# Patient Record
Sex: MALE | Race: WHITE | Employment: OTHER | URBAN - METROPOLITAN AREA
[De-identification: names, ages, dates, MRNs, and addresses within clinical notes are randomized per-mention and may not be internally consistent; named-entity substitution may affect disease eponyms.]

---

## 2018-05-01 ENCOUNTER — OFFICE VISIT (OUTPATIENT)
Dept: SURGERY | Age: 73
End: 2018-05-01

## 2018-05-01 VITALS
DIASTOLIC BLOOD PRESSURE: 63 MMHG | HEIGHT: 66 IN | TEMPERATURE: 98 F | HEART RATE: 57 BPM | OXYGEN SATURATION: 99 % | BODY MASS INDEX: 34.65 KG/M2 | WEIGHT: 215.6 LBS | SYSTOLIC BLOOD PRESSURE: 134 MMHG | RESPIRATION RATE: 20 BRPM

## 2018-05-01 DIAGNOSIS — Z12.11 ENCOUNTER FOR SCREENING COLONOSCOPY: Primary | ICD-10-CM

## 2018-05-01 RX ORDER — LANOLIN ALCOHOL/MO/W.PET/CERES
CREAM (GRAM) TOPICAL
COMMUNITY

## 2018-05-01 RX ORDER — POLYETHYLENE GLYCOL 3350, SODIUM SULFATE ANHYDROUS, SODIUM BICARBONATE, SODIUM CHLORIDE, POTASSIUM CHLORIDE 236; 22.74; 6.74; 5.86; 2.97 G/4L; G/4L; G/4L; G/4L; G/4L
POWDER, FOR SOLUTION ORAL
Qty: 1 BOTTLE | Refills: 0 | Status: SHIPPED | OUTPATIENT
Start: 2018-05-01 | End: 2018-05-17

## 2018-05-01 NOTE — PROGRESS NOTES
[place]  [address] [address]  [phone]    Colonoscopy History and Physical    Patient: Bladimir Serrano MRN: [id number]  SSN: [id number]    YOB: 1945  Age: 81 y.o. Sex: male      Subjective:      Bladimir Serrano is a 81 y.o. male who presents for colonoscopy for   Personal history of colon polyps (screening only), Screening colonoscopy. Patient has no complaints except for occasional gas. Last colonoscopy 5 years ago, 2 benign polyps found. Patient reports family history and abdominal surgeries as noted below. Past Medical History:   Diagnosis Date    Anemia     Epididymitis, left     Hypercholesterolemia     Hypertension     Stroke [facility])      Past Surgical History:   Procedure Laterality Date    HX COLONOSCOPY      HX OTHER SURGICAL  10/1/2015    [facility], Lumbar, Dr. Enrique Barnes [address] [facility]  11/4/2015    [facility], Hardware revision with removal of left S1 screw, Dr. Enrique Barnes [address] [facility]  11/11/2015    [facility], Revision of posterior fusion hardware including placement of left S1 screw, placement of cross-link, intraoperative neuronavigation and intraoperative neuromonitoring. Also revision of posterolateral arthrodesis, Dr. Enrique Barnes HX SHOULDER REPLACEMENT        Family History   Problem Relation Age of Onset    Asthma Other      Social History   Substance Use Topics    Smoking status: Never Smoker    Smokeless tobacco: Never Used    Alcohol use No      Prior to Admission medications    Medication Sig Start Date End Date Taking? Authorizing Provider   ferrous sulfate 325 mg (65 mg iron) tablet Take  by mouth Daily (before breakfast). Yes Historical Provider   lansoprazole (PREVACID) 30 mg capsule Take  by mouth Daily (before breakfast). Yes Historical Provider   gabapentin (NEURONTIN) 800 mg tablet Take  by mouth three (3) times daily.    Yes Historical Provider   Desvenlafaxine SR (PRISTIQ) 50 mg tablet Take 50 mg by mouth. Yes Historical Provider   metoprolol (LOPRESSOR) 50 mg tablet Take  by mouth two (2) times a day. Yes Historical Provider   simvastatin (ZOCOR) 40 mg tablet Take  by mouth nightly. Yes Historical Provider   traMADol (ULTRAM) 50 mg tablet Take 50 mg by mouth every six (6) hours as needed for Pain. Yes Historical Provider   traZODone (DESYREL) 50 mg tablet Take  by mouth nightly. Yes Historical Provider   loteprednol etabonate (ALREX) 0.2 % drps Administer 1 Drop to both eyes four (4) times daily. Yes Historical Provider   omega-3 fatty acids-vitamin e (FISH OIL) 1,000 mg cap Take 1 Cap by mouth. Yes Historical Provider   multivitamin (ONE A DAY) tablet Take 1 Tab by mouth daily. Yes Historical Provider   ascorbic acid (VITAMIN C) 250 mg tablet Take  by mouth. Yes Historical Provider   aspirin delayed-release 81 mg tablet Take  by mouth daily. Yes Historical Provider   cephALEXin (KEFLEX) 500 mg capsule Take 500 mg by mouth four (4) times daily. Historical Provider   methylPREDNISolone (MEDROL) 4 mg tablet Take  by mouth daily (with breakfast). Historical Provider   cyclobenzaprine (FLEXERIL) 10 mg tablet Take  by mouth three (3) times daily as needed for Muscle Spasm(s). Historical Provider   flurbiprofen (ANSAID) 100 mg tablet Take 100 mg by mouth two (2) times a day. Historical Provider   irbesartan-hydrochlorothiazide (AVALIDE) 150-12.5 mg per tablet Take  by mouth. Historical Provider   clopidogrel (PLAVIX) 75 mg tablet Take  by mouth daily. Historical Provider   oxyCODONE-acetaminophen (PERCOCET) 5-325 mg per tablet Take 1 Tab by mouth every four (4) hours as needed for Pain. Historical Provider   diazepam (VALIUM) 5 mg tablet Take 5 mg by mouth every six (6) hours as needed for Anxiety. Historical Provider   docusate sodium (COLACE) 100 mg capsule Take 100 mg by mouth two (2) times a day.     Historical Provider   CETIRIZINE HCL (ALLER-JOI PO) Take  by mouth. Historical Provider        No Known Allergies    Review of Systems:  Review of systems performed with findings as noted. Objective:     Vitals:    05/01/18 0900   BP: 134/63   Pulse: (!) 57   Resp: 20   Temp: 98 °F (36.7 °C)   TempSrc: Oral   SpO2: 99%   Weight: 97.8 kg (215 lb 9.6 oz)   Height: 5' 6\" (1.676 m)        Physical Exam:  GENERAL: alert, cooperative, no distress, appears stated age  LUNG: clear to auscultation bilaterally  HEART: regular rate and rhythm, S1, S2 normal, no murmur, click, rub or gallop  ABDOMEN: soft, non-tender. Bowel sounds normal. No masses,  no organomegaly  NEUROLOGIC: negative  PSYCHIATRIC: non focal    Assessment:   Jan Devi is a 81 y.o. male who presents for colonoscopy for   Personal history of colon polyps (screening only), Screening colonoscopy    Plan:   1. I recommend proceeding with colonoscopy. The patient was in full agreement and was eager to proceed. 2. I discussed the details of the colonoscopy procedure. The risks of colonoscopy were discussed including colon injury/perforation, anesthesia issues, bleeding, and the possibility of incomplete examination. The patient was willing to accept these risks and proceed with the examination. All questions were answered to the patient's satisfaction. 3. The patient was provided with the instructions in preparation for the colonoscopy procedure including the bowel prep recommendations.

## 2018-05-01 NOTE — PROGRESS NOTES
Colon Screen    Patient: Jude Yeager MRN: [id number]  SSN: [id number]    YOB: 1945  Age: 81 y.o. Sex: male        Subjective:   Jude Yeager presents for colon screening. PCP is Harsh Jimenez NP. Patient DENIES rectal pain or bleeding. Abdominal surgeries as described below, specifically back surgery 2 rods in back with screws, went in through abdomen to get to back. Patient has a bowel movement 1 per day. Patient denies changes in weight, bowel habits, or appetite. Patient has no known family history of colon cancer. Last colonoscopy was 8 year. No Known Allergies    Past Medical History:   Diagnosis Date    Epididymitis, left     Hypertension     Stroke [facility])      Past Surgical History:   Procedure Laterality Date    HX COLONOSCOPY      HX OTHER SURGICAL  10/1/2015    [facility], Lumbar, Dr. Mac Gamboa [address] [facility]  11/4/2015    [facility], Hardware revision with removal of left S1 screw, Dr. Mac Gamboa [address] [facility]  11/11/2015    [facility], Revision of posterior fusion hardware including placement of left S1 screw, placement of cross-link, intraoperative neuronavigation and intraoperative neuromonitoring. Also revision of posterolateral arthrodesis, Dr. Mac Gamboa HX SHOULDER REPLACEMENT        Family History   Problem Relation Age of Onset    Asthma Other      Social History   Substance Use Topics    Smoking status: Never Smoker    Smokeless tobacco: Never Used    Alcohol use No      Prior to Admission medications    Medication Sig Start Date End Date Taking? Authorizing Provider   lansoprazole (PREVACID) 30 mg capsule Take  by mouth Daily (before breakfast). Yes Historical Provider   gabapentin (NEURONTIN) 800 mg tablet Take  by mouth three (3) times daily. Yes Historical Provider   Desvenlafaxine SR (PRISTIQ) 50 mg tablet Take 50 mg by mouth. Yes Historical Provider   metoprolol (LOPRESSOR) 50 mg tablet Take  by mouth two (2) times a day.    Yes Historical Provider   simvastatin (ZOCOR) 40 mg tablet Take  by mouth nightly. Yes Historical Provider   traMADol (ULTRAM) 50 mg tablet Take 50 mg by mouth every six (6) hours as needed for Pain. Yes Historical Provider   traZODone (DESYREL) 50 mg tablet Take  by mouth nightly. Yes Historical Provider   loteprednol etabonate (ALREX) 0.2 % drps Administer 1 Drop to both eyes four (4) times daily. Yes Historical Provider   omega-3 fatty acids-vitamin e (FISH OIL) 1,000 mg cap Take 1 Cap by mouth. Yes Historical Provider   multivitamin (ONE A DAY) tablet Take 1 Tab by mouth daily. Yes Historical Provider   ascorbic acid (VITAMIN C) 250 mg tablet Take  by mouth. Yes Historical Provider   aspirin delayed-release 81 mg tablet Take  by mouth daily. Yes Historical Provider   cephALEXin (KEFLEX) 500 mg capsule Take 500 mg by mouth four (4) times daily. Historical Provider   methylPREDNISolone (MEDROL) 4 mg tablet Take  by mouth daily (with breakfast). Historical Provider   cyclobenzaprine (FLEXERIL) 10 mg tablet Take  by mouth three (3) times daily as needed for Muscle Spasm(s). Historical Provider   flurbiprofen (ANSAID) 100 mg tablet Take 100 mg by mouth two (2) times a day. Historical Provider   irbesartan-hydrochlorothiazide (AVALIDE) 150-12.5 mg per tablet Take  by mouth. Historical Provider   clopidogrel (PLAVIX) 75 mg tablet Take  by mouth daily. Historical Provider   oxyCODONE-acetaminophen (PERCOCET) 5-325 mg per tablet Take 1 Tab by mouth every four (4) hours as needed for Pain. Historical Provider   diazepam (VALIUM) 5 mg tablet Take 5 mg by mouth every six (6) hours as needed for Anxiety. Historical Provider   docusate sodium (COLACE) 100 mg capsule Take 100 mg by mouth two (2) times a day. Historical Provider   CETIRIZINE HCL (ALLER-JOI PO) Take  by mouth.     Historical Provider          Review of Systems:  Gastrointestinal: negative      Risks colonoscopy described- colon injury, missed lesion, anesthesia problems, bleeding. Colonoscopy preparation reviewed in detail with patient and a copy of the instructions was provided to the patient.        Albert Jones LPN  May 1, 9287  9:59 AM

## 2018-05-01 NOTE — PATIENT INSTRUCTIONS
If you have any questions or concerns about today's appointment, the verbal and/or written instructions you were given for follow up care, please call our office at [phone].     [facility] - [address]    [phone] office  [phone]

## 2018-05-17 ENCOUNTER — HOSPITAL ENCOUNTER (OUTPATIENT)
Age: 73
Setting detail: OUTPATIENT SURGERY
Discharge: HOME | End: 2018-05-17
Attending: COLON & RECTAL SURGERY | Admitting: COLON & RECTAL SURGERY
Payer: OTHER GOVERNMENT

## 2018-05-17 VITALS
HEIGHT: 66 IN | SYSTOLIC BLOOD PRESSURE: 159 MMHG | HEART RATE: 73 BPM | DIASTOLIC BLOOD PRESSURE: 72 MMHG | RESPIRATION RATE: 12 BRPM | TEMPERATURE: 98.2 F | WEIGHT: 210 LBS | OXYGEN SATURATION: 96 % | BODY MASS INDEX: 33.75 KG/M2

## 2018-05-17 PROCEDURE — 77030031670 HC DEV INFL ENDOTEK BIG60 MRTM -B: Performed by: COLON & RECTAL SURGERY

## 2018-05-17 PROCEDURE — G0500 MOD SEDAT ENDO SERVICE >5YRS: HCPCS | Performed by: COLON & RECTAL SURGERY

## 2018-05-17 PROCEDURE — 99153 MOD SED SAME PHYS/QHP EA: CPT | Performed by: COLON & RECTAL SURGERY

## 2018-05-17 PROCEDURE — 74011250636 HC RX REV CODE- 250/636: Performed by: COLON & RECTAL SURGERY

## 2018-05-17 PROCEDURE — 76040000007: Performed by: COLON & RECTAL SURGERY

## 2018-05-17 RX ORDER — NALOXONE HYDROCHLORIDE 0.4 MG/ML
0.4 INJECTION, SOLUTION INTRAMUSCULAR; INTRAVENOUS; SUBCUTANEOUS
Status: DISCONTINUED | OUTPATIENT
Start: 2018-05-17 | End: 2018-05-17 | Stop reason: HOSPADM

## 2018-05-17 RX ORDER — FLUMAZENIL 0.1 MG/ML
0.2 INJECTION INTRAVENOUS
Status: DISCONTINUED | OUTPATIENT
Start: 2018-05-17 | End: 2018-05-17 | Stop reason: HOSPADM

## 2018-05-17 RX ORDER — SODIUM CHLORIDE 0.9 % (FLUSH) 0.9 %
5-10 SYRINGE (ML) INJECTION EVERY 8 HOURS
Status: DISCONTINUED | OUTPATIENT
Start: 2018-05-17 | End: 2018-05-17 | Stop reason: HOSPADM

## 2018-05-17 RX ORDER — MIDAZOLAM HYDROCHLORIDE 1 MG/ML
.25-5 INJECTION, SOLUTION INTRAMUSCULAR; INTRAVENOUS
Status: DISCONTINUED | OUTPATIENT
Start: 2018-05-17 | End: 2018-05-17 | Stop reason: HOSPADM

## 2018-05-17 RX ORDER — ATROPINE SULFATE 0.1 MG/ML
0.5 INJECTION INTRAVENOUS
Status: DISCONTINUED | OUTPATIENT
Start: 2018-05-17 | End: 2018-05-17 | Stop reason: HOSPADM

## 2018-05-17 RX ORDER — SODIUM CHLORIDE 0.9 % (FLUSH) 0.9 %
5-10 SYRINGE (ML) INJECTION AS NEEDED
Status: DISCONTINUED | OUTPATIENT
Start: 2018-05-17 | End: 2018-05-17 | Stop reason: HOSPADM

## 2018-05-17 RX ORDER — SODIUM CHLORIDE 9 MG/ML
50 INJECTION, SOLUTION INTRAVENOUS CONTINUOUS
Status: DISCONTINUED | OUTPATIENT
Start: 2018-05-17 | End: 2018-05-17 | Stop reason: HOSPADM

## 2018-05-17 RX ORDER — EPINEPHRINE 0.1 MG/ML
1 INJECTION INTRACARDIAC; INTRAVENOUS
Status: DISCONTINUED | OUTPATIENT
Start: 2018-05-17 | End: 2018-05-17 | Stop reason: HOSPADM

## 2018-05-17 RX ORDER — DEXTROMETHORPHAN/PSEUDOEPHED 2.5-7.5/.8
1.2 DROPS ORAL
Status: DISCONTINUED | OUTPATIENT
Start: 2018-05-17 | End: 2018-05-17 | Stop reason: HOSPADM

## 2018-05-17 RX ADMIN — SODIUM CHLORIDE 50 ML/HR: 900 INJECTION, SOLUTION INTRAVENOUS at 12:20

## 2018-05-17 NOTE — DISCHARGE INSTRUCTIONS
Colonoscopy Discharge Instructions       Tammi Ferreira  [id number]  1945      COLONOSCOPY FINDINGS:  Your colonoscopy showed: 1. Mostly moderate to severe diverticulosis of the colon. 2. Otherwise normal examination. FOLLOW UP RECOMMENDATIONS:   Dr. Rik Truong recommends your next colonoscopy in 5 years. DISCOMFORT:  If you have redness at your IV site- apply warm compress to area; if redness or soreness persist- contact your physician  There may be a slight amount of blood passed from the rectum, more than a teaspoon of bright red blood is not expected - contact your physician  Gaseous discomfort is common- walking, belching will help relieve any gas pains. If discomfort persist- contact your physician    DIET:   High fiber diet. ACTIVITY:  You may resume your normal daily activities, however, it is recommended that you spend the remainder of the day resting - avoiding any strenuous activities. You may not operate a vehicle for 24 hours  You may not engage in an occupation involving machinery or appliances for rest of today  You may not drink alcoholic beverages for at least 24 hours  Avoid making any critical decisions for at least 24 hour    CALL M.D.   ANY SIGN OF:   Increasing pain, nausea, vomiting  Abdominal distension (swelling)  New increased bleeding   Fever or chills  Pain in chest area or shortness of breath      Amanda Schneider MD, FACS, FASCRS  Colon and Rectal Surgery  [facility]  Office [phone]  Fax     [phone] SUMMARY from Nurse    PATIENT INSTRUCTIONS:    After general anesthesia or intravenous sedation, for 24 hours or while taking prescription Narcotics:  · Limit your activities  · Do not drive and operate hazardous machinery  · Do not make important personal or business decisions  · Do  not drink alcoholic beverages  · If you have not urinated within 8 hours after discharge, please contact your surgeon on call.    Report the following to your surgeon:  · Excessive pain, swelling, redness or odor of or around the surgical area  · Temperature over 100.5  · Nausea and vomiting lasting longer than 4 hours or if unable to take medications  · Any signs of decreased circulation or nerve impairment to extremity: change in color, persistent  numbness, tingling, coldness or increase pain  · Any questions    What to do at Home:  Recommended activity: Activity as tolerated and no driving for today    These are general instructions for a healthy lifestyle:    No smoking/ No tobacco products/ Avoid exposure to second hand smoke  Surgeon General's Warning:  Quitting smoking now greatly reduces serious risk to your health. Obesity, smoking, and sedentary lifestyle greatly increases your risk for illness    A healthy diet, regular physical exercise & weight monitoring are important for maintaining a healthy lifestyle    You may be retaining fluid if you have a history of heart failure or if you experience any of the following symptoms:  Weight gain of 3 pounds or more overnight or 5 pounds in a week, increased swelling in our hands or feet or shortness of breath while lying flat in bed. Please call your doctor as soon as you notice any of these symptoms; do not wait until your next office visit. Recognize signs and symptoms of STROKE:    F-face looks uneven    A-arms unable to move or move unevenly    S-speech slurred or non-existent    T-time-call 911 as soon as signs and symptoms begin-DO NOT go       Back to bed or wait to see if you get better-TIME IS BRAIN. Warning Signs of HEART ATTACK     Call 911 if you have these symptoms:   Chest discomfort. Most heart attacks involve discomfort in the center of the chest that lasts more than a few minutes, or that goes away and comes back. It can feel like uncomfortable pressure, squeezing, fullness, or pain.  Discomfort in other areas of the upper body.  Symptoms can include pain or discomfort in one or both arms, the back, neck, jaw, or stomach.  Shortness of breath with or without chest discomfort.  Other signs may include breaking out in a cold sweat, nausea, or lightheadedness. Don't wait more than five minutes to call 911 - MINUTES MATTER! Fast action can save your life. Calling 911 is almost always the fastest way to get lifesaving treatment. Emergency Medical Services staff can begin treatment when they arrive -- up to an hour sooner than if someone gets to the hospital by car. The discharge information has been reviewed with the patient. The patient verbalized understanding. Discharge medications reviewed with the patient and appropriate educational materials and side effects teaching were provided. Patient armband removed and given to patient to take home. Patient was informed of the privacy risks if armband lost or stolen.

## 2018-05-17 NOTE — INTERVAL H&P NOTE
H&P Update:  Stalin Galvez was seen and examined. History and physical has been reviewed. The patient has been examined.  There have been no significant clinical changes since the completion of the originally dated History and Physical.    Signed By: Safia Parra MD     May 17, 2018 12:11 PM

## 2018-05-17 NOTE — IP AVS SNAPSHOT
[address] [facility], [place] 
 
 
 [address] 
[phone] Patient: Miguel Álvarez MRN: [id number] [facility] About your hospitalization You were admitted on:  May 17, 2018 You last received care in the:  [facility] PHASE 2 RECOVERY You were discharged on:  May 17, 2018 Why you were hospitalized Your primary diagnosis was:  Not on File Follow-up Information Follow up With Details Comments Contact Info Delmy Sharma MD   [address] [facility] [address] [id number] [phone] Sukhi Faustin MD  Please contact Dr Jefry Sykes for any questions [address] [facility] [address] [phone] Discharge Orders None A check josé indicates which time of day the medication should be taken. My Medications CONTINUE taking these medications Instructions Each Dose to Equal  
 Morning Noon Evening Bedtime ALLER-JOI PO Your last dose was: Your next dose is: Take  by mouth. ALREX 0.2 % Ezekiel Generic drug:  loteprednol etabonate Your last dose was: Your next dose is:    
   
   
 Administer 1 Drop to both eyes four (4) times daily. 1 Drop  
    
   
   
   
  
 aspirin delayed-release 81 mg tablet Your last dose was: Your next dose is: Take  by mouth daily. cephALEXin 500 mg capsule Commonly known as:  Tyron Neer Your last dose was: Your next dose is: Take 500 mg by mouth four (4) times daily. 500 mg  
    
   
   
   
  
 clopidogrel 75 mg Tab Commonly known as:  PLAVIX Your last dose was: Your next dose is: Take  by mouth daily. cyclobenzaprine 10 mg tablet Commonly known as:  FLEXERIL Your last dose was: Your next dose is: Take  by mouth three (3) times daily as needed for Muscle Spasm(s). diazePAM 5 mg tablet Commonly known as:  VALIUM Your last dose was: Your next dose is: Take 5 mg by mouth every six (6) hours as needed for Anxiety. 5 mg  
    
   
   
   
  
 docusate sodium 100 mg capsule Commonly known as:  Anastasia Rogelio Your last dose was: Your next dose is: Take 100 mg by mouth two (2) times a day. 100 mg  
    
   
   
   
  
 ferrous sulfate 325 mg (65 mg iron) tablet Your last dose was: Your next dose is: Take  by mouth Daily (before breakfast). FISH OIL 1,000 mg Cap Generic drug:  omega-3 fatty acids-vitamin e Your last dose was: Your next dose is: Take 1 Cap by mouth. 1 Cap  
    
   
   
   
  
 flurbiprofen 100 mg tablet Commonly known as:  ANSAID Your last dose was: Your next dose is: Take 100 mg by mouth two (2) times a day. 100 mg  
    
   
   
   
  
 gabapentin 800 mg tablet Commonly known as:  NEURONTIN Your last dose was: Your next dose is: Take  by mouth three (3) times daily. irbesartan-hydroCHLOROthiazide 150-12.5 mg per tablet Commonly known as:  AVALIDE Your last dose was: Your next dose is: Take  by mouth.  
     
   
   
   
  
 lansoprazole 30 mg capsule Commonly known as:  PREVACID Your last dose was: Your next dose is: Take  by mouth Daily (before breakfast). methylPREDNISolone 4 mg Tab Commonly known as:  MEDROL Your last dose was: Your next dose is: Take  by mouth daily (with breakfast). metoprolol tartrate 50 mg tablet Commonly known as:  LOPRESSOR Your last dose was: Your next dose is: Take  by mouth two (2) times a day. multivitamin tablet Commonly known as:  ONE A DAY Your last dose was: Your next dose is: Take 1 Tab by mouth daily. 1 Tab  
    
   
   
   
  
 oxyCODONE-acetaminophen 5-325 mg per tablet Commonly known as:  PERCOCET Your last dose was: Your next dose is: Take 1 Tab by mouth every four (4) hours as needed for Pain. 1 Tab PRISTIQ 50 mg ER tablet Generic drug:  desvenlafaxine succinate Your last dose was: Your next dose is: Take 50 mg by mouth. 50 mg  
    
   
   
   
  
 simvastatin 40 mg tablet Commonly known as:  ZOCOR Your last dose was: Your next dose is: Take  by mouth nightly. traMADol 50 mg tablet Commonly known as:  ULTRAM  
   
Your last dose was: Your next dose is: Take 50 mg by mouth every six (6) hours as needed for Pain. 50 mg  
    
   
   
   
  
 traZODone 50 mg tablet Commonly known as:  [place] Your last dose was: Your next dose is: Take  by mouth nightly. VITAMIN C 250 mg tablet Generic drug:  ascorbic acid (vitamin C) Your last dose was: Your next dose is: Take  by mouth. STOP taking these medications PEG 3350-Electrolytes 236-22.74-6.74 -5.86 gram suspension Commonly known as:  MK-REYNA Opioid Education Prescription Opioids: What You Need to Know: 
 
Prescription opioids can be used to help relieve moderate-to-severe pain and are often prescribed following a surgery or injury, or for certain health conditions. These medications can be an important part of treatment but also come with serious risks. Opioids are strong pain medicines.  Examples include hydrocodone, oxycodone, fentanyl, and morphine. Heroin is an example of an illegal opioid. It is important to work with your health care provider to make sure you are getting the safest, most effective care. WHAT ARE THE RISKS AND SIDE EFFECTS OF OPIOID USE? Prescription opioids carry serious risks of addiction and overdose, especially with prolonged use. An opioid overdose, often marked by slow breathing, can cause sudden death. The use of prescription opioids can have a number of side effects as well, even when taken as directed. · Tolerance-meaning you might need to take more of a medication for the same pain relief · Physical dependence-meaning you have symptoms of withdrawal when the medication is stopped. Withdrawal symptoms can include nausea, sweating, chills, diarrhea, stomach cramps, and muscle aches. Withdrawal can last up to several weeks, depending on which drug you took and how long you took it. · Increased sensitivity to pain · Constipation · Nausea, vomiting, and dry mouth · Sleepiness and dizziness · Confusion · Depression · Low levels of testosterone that can result in lower sex drive, energy, and strength · Itching and sweating RISKS ARE GREATER WITH:      
· History of drug misuse, substance use disorder, or overdose · Mental health conditions (such as depression or anxiety) · Sleep apnea · Older age (72 years or older) · Pregnancy Avoid alcohol while taking prescription opioids. Also, unless specifically advised by your health care provider, medications to avoid include: · Benzodiazepines (such as Xanax or Valium) · Muscle relaxants (such as Soma or Flexeril) · Hypnotics (such as Ambien or Lunesta) · Other prescription opioids KNOW YOUR OPTIONS Talk to your health care provider about ways to manage your pain that don't involve prescription opioids. Some of these options may actually work better and have fewer risks and side effects. Options may include: · Pain relievers such as acetaminophen, ibuprofen, and naproxen · Some medications that are also used for depression or seizures · Physical therapy and exercise · Counseling to help patients learn how to cope better with triggers of pain and stress. · Application of heat or cold compress · Massage therapy · Relaxation techniques Be Informed Make sure you know the name of your medication, how much and how often to take it, and its potential risks & side effects. IF YOU ARE PRESCRIBED OPIOIDS FOR PAIN: 
· Never take opioids in greater amounts or more often than prescribed. Remember the goal is not to be pain-free but to manage your pain at a tolerable level. · Follow up with your primary care provider to: · Work together to create a plan on how to manage your pain. · Talk about ways to help manage your pain that don't involve prescription opioids. · Talk about any and all concerns and side effects. · Help prevent misuse and abuse. · Never sell or share prescription opioids · Help prevent misuse and abuse. · Store prescription opioids in a secure place and out of reach of others (this may include visitors, children, friends, and family). · Safely dispose of unused/unwanted prescription opioids: Find your community drug take-back program or your pharmacy mail-back program, or flush them down the toilet, following guidance from the Food and Drug Administration (www.fda.gov/Drugs/ResourcesForYou). · Visit www.cdc.gov/drugoverdose to learn about the risks of opioid abuse and overdose. · If you believe you may be struggling with addiction, tell your health care provider and ask for guidance or call [employer] at 1-263-755-DRCT. Discharge Instructions Colonoscopy Discharge Instructions Otilia Aj 
[id number] 
1945 COLONOSCOPY FINDINGS: 
 Your colonoscopy showed: 1. Mostly moderate to severe diverticulosis of the colon. 2. Otherwise normal examination. FOLLOW UP RECOMMENDATIONS: 
 Dr. Carlos Wood recommends your next colonoscopy in 5 years. DISCOMFORT: 
If you have redness at your IV site- apply warm compress to area; if redness or soreness persist- contact your physician There may be a slight amount of blood passed from the rectum, more than a teaspoon of bright red blood is not expected - contact your physician Gaseous discomfort is common- walking, belching will help relieve any gas pains. If discomfort persist- contact your physician DIET: 
 High fiber diet. ACTIVITY: 
You may resume your normal daily activities, however, it is recommended that you spend the remainder of the day resting - avoiding any strenuous activities. You may not operate a vehicle for 24 hours You may not engage in an occupation involving machinery or appliances for rest of today You may not drink alcoholic beverages for at least 24 hours Avoid making any critical decisions for at least 24 hour CALL M.D. ANY SIGN OF: Increasing pain, nausea, vomiting Abdominal distension (swelling) New increased bleeding Fever or chills Pain in chest area or shortness of breath Edward Delacruz MD, FACS, FASCRS Colon and Rectal Surgery [facility] Office [phone] Fax     [phone] DISCHARGE SUMMARY from Nurse PATIENT INSTRUCTIONS: 
 
After general anesthesia or intravenous sedation, for 24 hours or while taking prescription Narcotics: · Limit your activities · Do not drive and operate hazardous machinery · Do not make important personal or business decisions · Do  not drink alcoholic beverages · If you have not urinated within 8 hours after discharge, please contact your surgeon on call. Report the following to your surgeon: · Excessive pain, swelling, redness or odor of or around the surgical area · Temperature over 100.5 · Nausea and vomiting lasting longer than 4 hours or if unable to take medications · Any signs of decreased circulation or nerve impairment to extremity: change in color, persistent  numbness, tingling, coldness or increase pain · Any questions What to do at Home: 
Recommended activity: Activity as tolerated and no driving for today These are general instructions for a healthy lifestyle: No smoking/ No tobacco products/ Avoid exposure to second hand smoke Surgeon General's Warning:  Quitting smoking now greatly reduces serious risk to your health. Obesity, smoking, and sedentary lifestyle greatly increases your risk for illness A healthy diet, regular physical exercise & weight monitoring are important for maintaining a healthy lifestyle You may be retaining fluid if you have a history of heart failure or if you experience any of the following symptoms:  Weight gain of 3 pounds or more overnight or 5 pounds in a week, increased swelling in our hands or feet or shortness of breath while lying flat in bed. Please call your doctor as soon as you notice any of these symptoms; do not wait until your next office visit. Recognize signs and symptoms of STROKE: 
 
F-face looks uneven A-arms unable to move or move unevenly S-speech slurred or non-existent T-time-call 911 as soon as signs and symptoms begin-DO NOT go Back to bed or wait to see if you get better-TIME IS BRAIN. Warning Signs of HEART ATTACK Call 911 if you have these symptoms: 
? Chest discomfort. Most heart attacks involve discomfort in the center of the chest that lasts more than a few minutes, or that goes away and comes back. It can feel like uncomfortable pressure, squeezing, fullness, or pain. ? Discomfort in other areas of the upper body.  Symptoms can include pain or discomfort in one or both arms, the back, neck, jaw, or stomach. ? Shortness of breath with or without chest discomfort. ? Other signs may include breaking out in a cold sweat, nausea, or lightheadedness. Don't wait more than five minutes to call [address]! Fast action can save your life. Calling 911 is almost always the fastest way to get lifesaving treatment. Emergency Medical Services staff can begin treatment when they arrive  up to an hour sooner than if someone gets to the hospital by car. The discharge information has been reviewed with the patient. The patient verbalized understanding. Discharge medications reviewed with the patient and appropriate educational materials and side effects teaching were provided. Patient armband removed and given to patient to take home. Patient was informed of the privacy risks if armband lost or stolen. Introducing [facility]! [facility] introduces SportsBeep patient portal. Now you can access parts of your medical record, email your doctor's office, and request medication refills online. 1. In your internet browser, go to [email or web address] 2. Click on the First Time User? Click Here link in the Sign In box. You will see the New Member Sign Up page. 3. Enter your SportsBeep Access Code exactly as it appears below. You will not need to use this code after youve completed the sign-up process. If you do not sign up before the expiration date, you must request a new code. · SportsBeep Access Code: [email or web address] Expires: 7/30/2018  8:18 AM 
 
4. Enter the last four digits of your Social Security Number (xxxx) and Date of Birth (mm/dd/yyyy) as indicated and click Submit. You will be taken to the next sign-up page. 5. Create a SportsBeep ID. This will be your SportsBeep login ID and cannot be changed, so think of one that is secure and easy to remember. 6. Create a [employer] password. You can change your password at any time. 7. Enter your Password Reset Question and Answer. This can be used at a later time if you forget your password. 8. Enter your e-mail address. You will receive e-mail notification when new information is available in [address] 9. Click Sign Up. You can now view and download portions of your medical record. 10. Click the Download Summary menu link to download a portable copy of your medical information. If you have questions, please visit the Frequently Asked Questions section of the [employer] website. Remember, [employer] Software is NOT to be used for urgent needs. For medical emergencies, dial 911. Now available from your iPhone and Android! Introducing Bruno López As a Ovidio Lee patient, I wanted to make you aware of our electronic visit tool called Bruno Parthabernicefin. Ovidio Eduquia/Qoopl allows you to connect within minutes with a medical provider 24 hours a day, seven days a week via a mobile device or tablet or logging into a secure website from your computer. You can access Bruno López from anywhere in the [place]. A virtual visit might be right for you when you have a simple condition and feel like you just dont want to get out of bed, or cant get away from work for an appointment, when your regular Ovidio Lee provider is not available (evenings, weekends or holidays), or when youre out of town and need minor care. Electronic visits cost only $49 and if the Ovidio Lee Swipe.to/Qoopl provider determines a prescription is needed to treat your condition, one can be electronically transmitted to a nearby pharmacy*. Please take a moment to enroll today if you have not already done so. The enrollment process is free and takes just a few minutes. To enroll, please download the [employer] Impact/Qoopl ronnie to your tablet or phone, or visit [email or web address] to enroll on your computer. And, as an [address] [place] [address] patient with a giddy account, the results of your visits will be scanned into your electronic medical record and your primary care provider will be able to view the scanned results. We urge you to continue to see your regular [employer] provider for your ongoing medical care. And while your primary care provider may not be the one available when you seek a Bruno Longfin virtual visit, the peace of mind you get from getting a real diagnosis real time can be priceless. For more information on Bruno Longfin, view our Frequently Asked Questions (FAQs) at [email or web address]. Sincerely, 
 
Luda Drake MD 
Chief Medical Officer [employer] *:  certain medications cannot be prescribed via Bruno Ethanfin Providers Seen During Your Hospitalization Provider Specialty Primary office phone Candace Go MD Colon and Rectal Surgery [phone] Your Primary Care Physician (PCP) Primary Care Physician Office Phone Office Paulo Bardales [phone] You are allergic to the following No active allergies Recent Documentation Height Weight BMI Smoking Status 1.676 m 95.3 kg 33.89 kg/m2 Never Smoker Emergency Contacts Name Discharge Info Relation Home Work Mobile Rukhsana Mercado DISCHARGE CAREGIVER [3] Spouse [3] [phone] Patient Belongings The following personal items are in your possession at time of discharge: 
  Dental Appliances: None  Visual Aid: None Please provide this summary of care documentation to your next provider. Signatures-by signing, you are acknowledging that this After Visit Summary has been reviewed with you and you have received a copy. Patient Signature:  ____________________________________________________________ Date:  ____________________________________________________________  
  
[place] [employer] Provider Signature:  ____________________________________________________________ Date:  ____________________________________________________________

## 2018-05-17 NOTE — H&P (VIEW-ONLY)
[address]  [address]  [address]  [address] [place] [address] [phone]    Colonoscopy History and Physical    Patient: Gabino Palacio MRN: [id number]  SSN: [id number]    YOB: 1945  Age: 81 y.o. Sex: male      Subjective:      Gabino Palacio is a 81 y.o. male who presents for colonoscopy for   Personal history of colon polyps (screening only), Screening colonoscopy. Patient has no complaints except for occasional gas. Last colonoscopy 5 years ago, 2 benign polyps found. Patient reports family history and abdominal surgeries as noted below. Past Medical History:   Diagnosis Date    Anemia     Epididymitis, left     Hypercholesterolemia     Hypertension     [facility])      Past Surgical History:   Procedure Laterality Date    HX COLONOSCOPY      HX OTHER SURGICAL  10/1/2015    [facility], Lumbar, Dr. Carlos Alberto Jimenez [address] [facility]  11/4/2015    [facility], Hardware revision with removal of left S1 screw, Dr. Carlos Alberto Jimenez [facility] [facility]  11/11/2015    [facility], Revision of posterior fusion hardware including placement of left S1 screw, placement of cross-link, intraoperative neuronavigation and intraoperative neuromonitoring. Also revision of posterolateral arthrodesis, Dr. Carlos Alberto Jimenez HX SHOULDER REPLACEMENT        Family History   Problem Relation Age of Onset    Asthma Other      Social History   Substance Use Topics    Smoking status: Never Smoker    Smokeless tobacco: Never Used    Alcohol use No      Prior to Admission medications    Medication Sig Start Date End Date Taking? Authorizing Provider   ferrous sulfate 325 mg (65 mg iron) tablet Take  by mouth Daily (before breakfast). Yes Historical Provider   lansoprazole (PREVACID) 30 mg capsule Take  by mouth Daily (before breakfast). Yes Historical Provider   gabapentin (NEURONTIN) 800 mg tablet Take  by mouth three (3) times daily.    Yes Historical Provider   Desvenlafaxine SR (PRISTIQ) 50 mg tablet Take 50 mg by mouth. Yes Historical Provider   metoprolol (LOPRESSOR) 50 mg tablet Take  by mouth two (2) times a day. Yes Historical Provider   simvastatin (ZOCOR) 40 mg tablet Take  by mouth nightly. Yes Historical Provider   traMADol (ULTRAM) 50 mg tablet Take 50 mg by mouth every six (6) hours as needed for Pain. Yes Historical Provider   traZODone (DESYREL) 50 mg tablet Take  by mouth nightly. Yes Historical Provider   loteprednol etabonate (ALREX) 0.2 % drps Administer 1 Drop to both eyes four (4) times daily. Yes Historical Provider   omega-3 fatty acids-vitamin e (FISH OIL) 1,000 mg cap Take 1 Cap by mouth. Yes Historical Provider   multivitamin (ONE A DAY) tablet Take 1 Tab by mouth daily. Yes Historical Provider   ascorbic acid (VITAMIN C) 250 mg tablet Take  by mouth. Yes Historical Provider   aspirin delayed-release 81 mg tablet Take  by mouth daily. Yes Historical Provider   cephALEXin (KEFLEX) 500 mg capsule Take 500 mg by mouth four (4) times daily. Historical Provider   methylPREDNISolone (MEDROL) 4 mg tablet Take  by mouth daily (with breakfast). Historical Provider   cyclobenzaprine (FLEXERIL) 10 mg tablet Take  by mouth three (3) times daily as needed for Muscle Spasm(s). Historical Provider   flurbiprofen (ANSAID) 100 mg tablet Take 100 mg by mouth two (2) times a day. Historical Provider   irbesartan-hydrochlorothiazide (AVALIDE) 150-12.5 mg per tablet Take  by mouth. Historical Provider   clopidogrel (PLAVIX) 75 mg tablet Take  by mouth daily. Historical Provider   oxyCODONE-acetaminophen (PERCOCET) 5-325 mg per tablet Take 1 Tab by mouth every four (4) hours as needed for Pain. Historical Provider   diazepam (VALIUM) 5 mg tablet Take 5 mg by mouth every six (6) hours as needed for Anxiety. Historical Provider   docusate sodium (COLACE) 100 mg capsule Take 100 mg by mouth two (2) times a day.     Historical Provider   CETIRIZINE HCL (ALLER-JOI PO) Take  by mouth. Historical Provider        No Known Allergies    Review of Systems:  Review of systems performed with findings as noted. Objective:     Vitals:    05/01/18 0900   BP: 134/63   Pulse: (!) 57   Resp: 20   Temp: 98 °F (36.7 °C)   TempSrc: Oral   SpO2: 99%   Weight: 97.8 kg (215 lb 9.6 oz)   Height: 5' 6\" (1.676 m)        Physical Exam:  GENERAL: alert, cooperative, no distress, appears stated age  LUNG: clear to auscultation bilaterally  HEART: regular rate and rhythm, S1, S2 normal, no murmur, click, rub or gallop  ABDOMEN: soft, non-tender. Bowel sounds normal. No masses,  no organomegaly  NEUROLOGIC: negative  PSYCHIATRIC: non focal    Assessment:   Sravanthi Alatorre is a 81 y.o. male who presents for colonoscopy for   Personal history of colon polyps (screening only), Screening colonoscopy    Plan:   1. I recommend proceeding with colonoscopy. The patient was in full agreement and was eager to proceed. 2. I discussed the details of the colonoscopy procedure. The risks of colonoscopy were discussed including colon injury/perforation, anesthesia issues, bleeding, and the possibility of incomplete examination. The patient was willing to accept these risks and proceed with the examination. All questions were answered to the patient's satisfaction. 3. The patient was provided with the instructions in preparation for the colonoscopy procedure including the bowel prep recommendations.

## 2018-05-17 NOTE — PROCEDURES
Colonoscopy Procedure Note      Bakari Kumar  [birth year]  [id number]                Date of Procedure: 5/17/2018    Indications:    Screening colonoscopy     Preoperative diagnosis: colon cancer screening  z12.11      Postoperative diagnosis: Colon diverticulosis    Title of Procedure:  Colonoscopy, screening (high risk)    :  Charmaine Gayle MD    Assistant(s): Circ-1: Bryan Lerma RN  Endoscopy Elizabeth Mink: Delray Severe  Endoscopy RN-1: Sheldon Philip RN    Referring Source:  Jannice Cowden, MD    Sedation:  Demerol 50 mg IV,  Versed 4 mg IV      ASA Class: ASA 3 - Severe systemic disease but compensated        Procedure Details:    Prior to the procedure, a history and physical were performed. The patients medications, allergies and sensitivities were reviewed and all questions were answered. After informed consent was obtained for the procedure, with all risks and benefits of procedure explained. The patient was taken to the endoscopy suite and placed in the left lateral decubitus position. Patient identification and proposed procedure were verified prior to the procedure by the nurse and I. Following the  satisfactory administration of sedation,  the anus was inspected and appeared within normal limits. Digital rectal examination revealed Normal sphincter tone and squeeze pressure. Palpation revealed No Masses. Next the Olympus video colonoscope was introduced through the anus and advanced to cecum, which was identified by the ileocecal valve and appendiceal orifice, terminal ileum. The quality of preparation was fair. The terminal ileum was visualized. The colonoscope was then slowly withdrawn and the mucosa carefully examined for any abnormalities. Cecal withdrawl time was greater than six minutes. The patient tolerated the procedure well. Findings:   Rectum: normal  Sigmoid: severe diverticulosis;   Descending Colon: moderate diverticulosis;  Transverse Colon: normal  Ascending Colon: mild diverticulosis; Cecum: normal  Terminal Ileum: normal      Interventions:  none    Specimen Removed: * No specimens in log *     Complications: None. EBL:  None. Impression:   Colon diverticulosis      Recommendations: -Repeat colonoscopy in 5 years. Resume normal medication(s). Discharge Disposition:  Home in the company of a  when able to ambulate.         Yohana Rose MD, FACS, FASCRS  Colon and Rectal Surgery  [facility]  Office [phone]  Fax     [phone]  5/17/2018  1:09 PM       [facility]  [address] [address], [address] [place] [address]

## 2018-05-18 ENCOUNTER — TELEPHONE (OUTPATIENT)
Dept: SURGERY | Age: 73
End: 2018-05-18

## 2018-05-18 NOTE — TELEPHONE ENCOUNTER
Received VM from patient's wife stating that he experienced an episode of vomiting this am. He is S/P colonoscopy by Dr. Delgado Sender yesterday with no polypectomy. Returned call to obtain more information. Per wife, patient was able to tolerate a fish dinner last night. Upon awaking he felt nausea with no pain. After eating toast he had an episode of vomiting x1 which was black with flecks. Patient reports chills with no fever. Patient is passing gas. He has not had a BM since procedure but is passing gas. Spoke with NP and advised to send to ED as patient is on blood thinner. Called wife and relayed the instructions from NP. Patient verbalized understanding.

## 2018-05-18 NOTE — TELEPHONE ENCOUNTER
Received call from patient himself at [phone] today stating that he has been D/C'd from [facility] ED as we had recommended he be evaluated this AM. Per patient they did blood work and found nothing concerning. They felt his vomiting may have been related to not eating enough. Dr. Conrad Severs at [facility] recommends patient consideran EGD to rule out ulcers. They would like for Dr. Das Daily to recommend someone in [place] preferably closer to [facility]. Will notify Dr. Das Daily.

## 2018-05-21 ENCOUNTER — TELEPHONE (OUTPATIENT)
Dept: SURGERY | Age: 73
End: 2018-05-21

## 2018-05-21 DIAGNOSIS — R11.2 NAUSEA AND VOMITING, INTRACTABILITY OF VOMITING NOT SPECIFIED, UNSPECIFIED VOMITING TYPE: Primary | ICD-10-CM

## 2018-05-21 NOTE — TELEPHONE ENCOUNTER
I called Mr. Samir Madden to ask if he does have other insurance other than the [facility] because when I called [facility] they told me they do not accept [place] insurance. Mr. Samir Madden said don't worry about it because his wife was able to find a doctor in [address]. Mr. Samir Madden said he will tell that doctor to send Dr Medraon Handing a copy of any office visit/ note that he will have in the future.

## 2018-05-21 NOTE — TELEPHONE ENCOUNTER
Per Dr. Charlie Avila patient should see Dr. Shayy Freitas or Dr. Tito Cordoba for EGD to rule out ulcer.  notified to arrange an appt.

## (undated) DEVICE — FLEX ADVANTAGE 1500CC: Brand: FLEX ADVANTAGE

## (undated) DEVICE — DEVICE INFL 60ML 12ATM CONVENIENT LOK REL HNDL HI PRSS FLX

## (undated) DEVICE — KENDALL 500 SERIES DIAPHORETIC FOAM MONITORING ELECTRODE - TEAR DROP SHAPE ( 30/PK): Brand: KENDALL

## (undated) DEVICE — SYR 50ML SLIP TIP NSAF LF STRL --

## (undated) DEVICE — MEDI-VAC NON-CONDUCTIVE SUCTION TUBING: Brand: CARDINAL HEALTH

## (undated) DEVICE — BASIN EMESIS 500CC ROSE 250/CS 60/PLT: Brand: MEDEGEN MEDICAL PRODUCTS, LLC

## (undated) DEVICE — KIT COLON W/ 1.1OZ LUB AND 2 END